# Patient Record
Sex: FEMALE | Race: BLACK OR AFRICAN AMERICAN | NOT HISPANIC OR LATINO | URBAN - METROPOLITAN AREA
[De-identification: names, ages, dates, MRNs, and addresses within clinical notes are randomized per-mention and may not be internally consistent; named-entity substitution may affect disease eponyms.]

---

## 2019-02-06 VITALS
RESPIRATION RATE: 20 BRPM | RESPIRATION RATE: 29 BRPM | HEART RATE: 70 BPM | HEART RATE: 65 BPM | WEIGHT: 125 LBS | HEART RATE: 79 BPM | DIASTOLIC BLOOD PRESSURE: 63 MMHG | DIASTOLIC BLOOD PRESSURE: 80 MMHG | RESPIRATION RATE: 22 BRPM | OXYGEN SATURATION: 100 % | HEIGHT: 63 IN | SYSTOLIC BLOOD PRESSURE: 87 MMHG | SYSTOLIC BLOOD PRESSURE: 96 MMHG | TEMPERATURE: 97 F | SYSTOLIC BLOOD PRESSURE: 123 MMHG | DIASTOLIC BLOOD PRESSURE: 74 MMHG | HEART RATE: 68 BPM | HEART RATE: 64 BPM | SYSTOLIC BLOOD PRESSURE: 91 MMHG | DIASTOLIC BLOOD PRESSURE: 42 MMHG | SYSTOLIC BLOOD PRESSURE: 109 MMHG | DIASTOLIC BLOOD PRESSURE: 39 MMHG | RESPIRATION RATE: 14 BRPM | RESPIRATION RATE: 10 BRPM | SYSTOLIC BLOOD PRESSURE: 136 MMHG | DIASTOLIC BLOOD PRESSURE: 46 MMHG

## 2019-02-11 ENCOUNTER — AMBULATORY SURGICAL CENTER (AMBULATORY)
Dept: URBAN - METROPOLITAN AREA SURGERY 17 | Facility: SURGERY | Age: 54
End: 2019-02-11
Payer: COMMERCIAL

## 2019-02-11 DIAGNOSIS — Z12.11 ENCOUNTER FOR SCREENING FOR MALIGNANT NEOPLASM OF COLON: ICD-10-CM

## 2019-02-11 DIAGNOSIS — Z80.0 FAMILY HISTORY OF MALIGNANT NEOPLASM OF DIGESTIVE ORGANS: ICD-10-CM

## 2019-02-11 PROCEDURE — 45378 DIAGNOSTIC COLONOSCOPY: CPT | Mod: 33

## 2020-09-24 ENCOUNTER — OFFICE VISIT (OUTPATIENT)
Dept: FAMILY MEDICINE CLINIC | Facility: CLINIC | Age: 55
End: 2020-09-24

## 2020-09-24 VITALS
SYSTOLIC BLOOD PRESSURE: 128 MMHG | HEIGHT: 62 IN | TEMPERATURE: 98.5 F | BODY MASS INDEX: 24.77 KG/M2 | WEIGHT: 134.6 LBS | OXYGEN SATURATION: 99 % | RESPIRATION RATE: 16 BRPM | HEART RATE: 62 BPM | DIASTOLIC BLOOD PRESSURE: 82 MMHG

## 2020-09-24 DIAGNOSIS — Z00.00 ANNUAL PHYSICAL EXAM: Primary | ICD-10-CM

## 2020-09-24 PROBLEM — G44.209 TENSION HEADACHE: Status: ACTIVE | Noted: 2019-08-01

## 2020-09-24 PROBLEM — E78.5 HYPERLIPIDEMIA: Status: ACTIVE | Noted: 2020-09-24

## 2020-09-24 PROCEDURE — 99396 PREV VISIT EST AGE 40-64: CPT | Performed by: INTERNAL MEDICINE

## 2020-09-24 RX ORDER — ROSUVASTATIN CALCIUM 10 MG/1
10 TABLET, COATED ORAL DAILY
COMMUNITY
End: 2021-09-03 | Stop reason: SDUPTHER

## 2020-09-24 NOTE — PROGRESS NOTES
09/24/2020    CC: Annual Exam  .        HPI  History of Present Illness     Subjective   Nancy Garcia is a 55 y.o. female.      The following portions of the patient's history were reviewed and updated as appropriate: allergies, current medications, past family history, past medical history, past social history, past surgical history and problem list.    Problem List  Patient Active Problem List   Diagnosis   • Tension headache   • Hyperlipidemia       Past Medical History  Past Medical History:   Diagnosis Date   • Hyperlipidemia 9/24/2020   • Tension headache 8/1/2019       Surgical History  Past Surgical History:   Procedure Laterality Date   • ABDOMINOPLASTY  2011   • LAPAROSCOPIC TUBAL LIGATION  2001       Family History  History reviewed. No pertinent family history.    Social History  Social History    Tobacco Use      Smoking status: Never Smoker      Smokeless tobacco: Never Used       Is the Patient a current tobacco user? No    Allergies  No Known Allergies    Current Medications    Current Outpatient Medications:   •  rosuvastatin (CRESTOR) 10 MG tablet, Take 10 mg by mouth Daily., Disp: , Rfl:      Review of System  Review of Systems   Constitutional: Negative.    HENT: Negative.    Eyes: Negative.    Respiratory: Negative.    Cardiovascular: Negative.    Gastrointestinal: Negative.    Musculoskeletal: Negative.    Skin: Negative.    Psychiatric/Behavioral: Negative.      I have reviewed and confirmed the accuracy of the ROS as documented by the MA/LPN/RN Murphy Mckee MD    Vitals:    09/24/20 1002   BP: 128/82   Pulse: 62   Resp: 16   Temp: 98.5 °F (36.9 °C)   SpO2: 99%     Body mass index is 24.82 kg/m².    Objective     No visits with results within 30 Day(s) from this visit.   Latest known visit with results is:   No results found for any previous visit.       Physical Exam  Physical Exam  Vitals signs and nursing note reviewed.   Constitutional:       Appearance: She is well-developed.    HENT:      Head: Normocephalic and atraumatic.   Eyes:      Conjunctiva/sclera: Conjunctivae normal.   Neck:      Musculoskeletal: Normal range of motion and neck supple.   Cardiovascular:      Rate and Rhythm: Normal rate and regular rhythm.      Heart sounds: Normal heart sounds.   Pulmonary:      Effort: Pulmonary effort is normal.      Breath sounds: Normal breath sounds.   Abdominal:      General: Bowel sounds are normal.      Palpations: Abdomen is soft.   Musculoskeletal: Normal range of motion.   Skin:     General: Skin is warm and dry.   Neurological:      Mental Status: She is alert and oriented to person, place, and time.   Psychiatric:         Behavior: Behavior normal.         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  She relates she is feeling fine she is had no problems in the interim of visits.    We relate her labs from 6/2020 which showed excellent comprehensive metabolic profile and CBC.  Lipid profile was likewise normal.  She is currently taking Crestor 10 mg p.o. daily and we noted that her LDL was only 62.    She relates she is scheduled to see her gynecologist in the next few weeks for her Pap and breast exam.        Nancy was seen today for annual exam.    Diagnoses and all orders for this visit:    Annual physical exam  -     Hepatitis C antibody    Other orders  -     Tdap Vaccine Greater Than or Equal To 8yo IM    Plan:  1.)  Continue Crestor 10 mg p.o. daily.  2.)  Follow-up in 6 months       Murphy Mckee MD  09/24/2020

## 2021-03-24 ENCOUNTER — OFFICE VISIT (OUTPATIENT)
Dept: FAMILY MEDICINE CLINIC | Facility: CLINIC | Age: 56
End: 2021-03-24

## 2021-03-24 VITALS
HEIGHT: 62 IN | DIASTOLIC BLOOD PRESSURE: 80 MMHG | SYSTOLIC BLOOD PRESSURE: 112 MMHG | TEMPERATURE: 98.2 F | OXYGEN SATURATION: 97 % | WEIGHT: 138.6 LBS | RESPIRATION RATE: 16 BRPM | BODY MASS INDEX: 25.51 KG/M2 | HEART RATE: 70 BPM

## 2021-03-24 DIAGNOSIS — R30.0 DYSURIA: ICD-10-CM

## 2021-03-24 DIAGNOSIS — E78.00 PURE HYPERCHOLESTEROLEMIA: Primary | ICD-10-CM

## 2021-03-24 LAB
BILIRUB BLD-MCNC: NEGATIVE MG/DL
CLARITY, POC: CLEAR
COLOR UR: YELLOW
GLUCOSE UR STRIP-MCNC: NEGATIVE MG/DL
KETONES UR QL: NEGATIVE
LEUKOCYTE EST, POC: NEGATIVE
NITRITE UR-MCNC: NEGATIVE MG/ML
PH UR: 6 [PH] (ref 5–8)
PROT UR STRIP-MCNC: ABNORMAL MG/DL
RBC # UR STRIP: NEGATIVE /UL
SP GR UR: 1.02 (ref 1–1.03)
UROBILINOGEN UR QL: NORMAL

## 2021-03-24 PROCEDURE — 81003 URINALYSIS AUTO W/O SCOPE: CPT | Performed by: INTERNAL MEDICINE

## 2021-03-24 PROCEDURE — 99213 OFFICE O/P EST LOW 20 MIN: CPT | Performed by: INTERNAL MEDICINE

## 2021-03-24 NOTE — PROGRESS NOTES
03/24/2021    CC: Hyperlipidemia (FOLLOW UP...no other issues)  .        HPI  This patient presents for discussion of her elevated cholesterol.  She relates she's feeling fine she's had no problems in the interim of visits.  She has not visited any urgent cares emergency room.  She does have some questions regarding the Covid 19vaccine.    Hyperlipidemia  This is a chronic problem. The current episode started more than 1 year ago. The problem is controlled. Recent lipid tests were reviewed and are normal. Current antihyperlipidemic treatment includes statins. The current treatment provides moderate improvement of lipids.        Subjective   Nancy Garcia is a 56 y.o. female.      The following portions of the patient's history were reviewed and updated as appropriate: allergies, current medications, past family history, past medical history, past social history, past surgical history and problem list.    Problem List  Patient Active Problem List   Diagnosis   • Tension headache   • Hyperlipidemia       Past Medical History  Past Medical History:   Diagnosis Date   • Hyperlipidemia 9/24/2020   • Tension headache 8/1/2019       Surgical History  Past Surgical History:   Procedure Laterality Date   • ABDOMINOPLASTY  2011   • LAPAROSCOPIC TUBAL LIGATION  2001       Family History  History reviewed. No pertinent family history.    Social History  Social History    Tobacco Use      Smoking status: Never Smoker      Smokeless tobacco: Never Used       Is the Patient a current tobacco user? No    Allergies  No Known Allergies    Current Medications    Current Outpatient Medications:   •  rosuvastatin (CRESTOR) 10 MG tablet, Take 10 mg by mouth Daily., Disp: , Rfl:      Review of System  Review of Systems   Eyes: Negative.    Respiratory: Negative.    Cardiovascular: Negative.    Gastrointestinal: Negative.    Endocrine: Negative.    Genitourinary: Positive for pelvic pressure.     I have reviewed and confirmed the accuracy  of the ROS as documented by the MA/LPN/RN Murphy Mckee MD    Vitals:    03/24/21 0948   BP: 112/80   Pulse: 70   Resp: 16   Temp: 98.2 °F (36.8 °C)   SpO2: 97%     Body mass index is 25.56 kg/m².    Objective     Physical Exam  Physical Exam  Eyes:      Extraocular Movements: Extraocular movements intact.   Cardiovascular:      Rate and Rhythm: Normal rate and regular rhythm.      Pulses: Normal pulses.      Heart sounds: Normal heart sounds.   Pulmonary:      Effort: Pulmonary effort is normal.      Breath sounds: Normal breath sounds.   Abdominal:      General: Abdomen is flat.         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  She relates she is taking her Crestor 10 mg as prescribed.  She has had no unusual achiness or joint discomfort.  She does complain of some pelvic discomfort.  She was seen recently by GYN and had a pelvic ultrasound done which showed no abnormalities.  She relates that the discomfort is occasionally with position.  It is mild and very occasional.  She has noted no change in her urinary stream no blood per urine. I reviewed with her her labs from 2018 consisting of CBC comprehensive metabolic profile and lipid panel.  They were essentially within normal limit.    We had a detailed discussion regarding the COVID-19 vaccine.  Is a [employer]  she would like to get the [employer] vaccine.  We will give her material on how to get signed up.    Her urine dip was unremarkable.  We will check her comprehensive metabolic panel and CBC.  We will see her back for follow-up in about 6 months.  We offered referral to urology for further evaluation of bladder discomfort however she declined.      Diagnoses and all orders for this visit:    1. Pure hypercholesterolemia (Primary)  -     Comprehensive Metabolic Panel  -     Lipid Panel With / Chol / HDL Ratio    2. Dysuria  -     Cancel: Urinalysis With Culture If Indicated -  -     CBC &  Differential  -     Hepatitis C Antibody  -     POCT urinalysis dipstick, automated      Plan:  1.)  Follow-up in 6 months.  For physical exam       Murphy Mckee MD  03/24/2021

## 2021-03-25 LAB
ALBUMIN SERPL-MCNC: 4.5 G/DL (ref 3.5–5.2)
ALBUMIN/GLOB SERPL: 2 G/DL
ALP SERPL-CCNC: 94 U/L (ref 39–117)
ALT SERPL-CCNC: 22 U/L (ref 1–33)
AST SERPL-CCNC: 18 U/L (ref 1–32)
BASOPHILS # BLD AUTO: 0.03 10*3/MM3 (ref 0–0.2)
BASOPHILS NFR BLD AUTO: 0.6 % (ref 0–1.5)
BILIRUB SERPL-MCNC: 0.5 MG/DL (ref 0–1.2)
BUN SERPL-MCNC: 9 MG/DL (ref 6–20)
BUN/CREAT SERPL: 14.5 (ref 7–25)
CALCIUM SERPL-MCNC: 9.3 MG/DL (ref 8.6–10.5)
CHLORIDE SERPL-SCNC: 104 MMOL/L (ref 98–107)
CHOLEST SERPL-MCNC: 135 MG/DL (ref 0–200)
CHOLEST/HDLC SERPL: 2.21 {RATIO}
CO2 SERPL-SCNC: 27.2 MMOL/L (ref 22–29)
CREAT SERPL-MCNC: 0.62 MG/DL (ref 0.57–1)
EOSINOPHIL # BLD AUTO: 0.12 10*3/MM3 (ref 0–0.4)
EOSINOPHIL NFR BLD AUTO: 2.4 % (ref 0.3–6.2)
ERYTHROCYTE [DISTWIDTH] IN BLOOD BY AUTOMATED COUNT: 12 % (ref 12.3–15.4)
GLOBULIN SER CALC-MCNC: 2.3 GM/DL
GLUCOSE SERPL-MCNC: 97 MG/DL (ref 65–99)
HCT VFR BLD AUTO: 41.4 % (ref 34–46.6)
HCV AB S/CO SERPL IA: <0.1 S/CO RATIO (ref 0–0.9)
HDLC SERPL-MCNC: 61 MG/DL (ref 40–60)
HGB BLD-MCNC: 14.2 G/DL (ref 12–15.9)
IMM GRANULOCYTES # BLD AUTO: 0.01 10*3/MM3 (ref 0–0.05)
IMM GRANULOCYTES NFR BLD AUTO: 0.2 % (ref 0–0.5)
LDLC SERPL CALC-MCNC: 63 MG/DL (ref 0–100)
LYMPHOCYTES # BLD AUTO: 1.38 10*3/MM3 (ref 0.7–3.1)
LYMPHOCYTES NFR BLD AUTO: 27.1 % (ref 19.6–45.3)
MCH RBC QN AUTO: 31.4 PG (ref 26.6–33)
MCHC RBC AUTO-ENTMCNC: 34.3 G/DL (ref 31.5–35.7)
MCV RBC AUTO: 91.6 FL (ref 79–97)
MONOCYTES # BLD AUTO: 0.36 10*3/MM3 (ref 0.1–0.9)
MONOCYTES NFR BLD AUTO: 7.1 % (ref 5–12)
NEUTROPHILS # BLD AUTO: 3.19 10*3/MM3 (ref 1.7–7)
NEUTROPHILS NFR BLD AUTO: 62.6 % (ref 42.7–76)
NRBC BLD AUTO-RTO: 0 /100 WBC (ref 0–0.2)
PLATELET # BLD AUTO: 260 10*3/MM3 (ref 140–450)
POTASSIUM SERPL-SCNC: 4.1 MMOL/L (ref 3.5–5.2)
PROT SERPL-MCNC: 6.8 G/DL (ref 6–8.5)
RBC # BLD AUTO: 4.52 10*6/MM3 (ref 3.77–5.28)
SODIUM SERPL-SCNC: 139 MMOL/L (ref 136–145)
TRIGL SERPL-MCNC: 47 MG/DL (ref 0–150)
VLDLC SERPL CALC-MCNC: 11 MG/DL (ref 5–40)
WBC # BLD AUTO: 5.09 10*3/MM3 (ref 3.4–10.8)

## 2021-09-03 DIAGNOSIS — E78.00 PURE HYPERCHOLESTEROLEMIA: Primary | ICD-10-CM

## 2021-09-03 RX ORDER — ROSUVASTATIN CALCIUM 10 MG/1
10 TABLET, COATED ORAL DAILY
Qty: 90 TABLET | Refills: 2 | Status: SHIPPED | OUTPATIENT
Start: 2021-09-03

## 2021-09-03 NOTE — TELEPHONE ENCOUNTER
Rx Refill Note  Requested Prescriptions     Pending Prescriptions Disp Refills   • rosuvastatin (CRESTOR) 10 MG tablet       Sig: Take 1 tablet by mouth Daily.      Last office visit with prescribing clinician: 3/24/2021      Next office visit with prescribing clinician: 9/24/2021            Nerissa Lewis MA  09/03/21, 15:37 EDT

## 2021-09-24 ENCOUNTER — OFFICE VISIT (OUTPATIENT)
Dept: FAMILY MEDICINE CLINIC | Facility: CLINIC | Age: 56
End: 2021-09-24

## 2021-09-24 VITALS
BODY MASS INDEX: 26.17 KG/M2 | SYSTOLIC BLOOD PRESSURE: 110 MMHG | DIASTOLIC BLOOD PRESSURE: 80 MMHG | WEIGHT: 142.2 LBS | HEIGHT: 62 IN | RESPIRATION RATE: 16 BRPM

## 2021-09-24 DIAGNOSIS — J30.2 SEASONAL ALLERGIC RHINITIS, UNSPECIFIED TRIGGER: Primary | Chronic | ICD-10-CM

## 2021-09-24 DIAGNOSIS — E78.00 PURE HYPERCHOLESTEROLEMIA: Chronic | ICD-10-CM

## 2021-09-24 DIAGNOSIS — Z20.822 CLOSE EXPOSURE TO COVID-19 VIRUS: ICD-10-CM

## 2021-09-24 PROBLEM — Z12.11 COLON CANCER SCREENING: Status: ACTIVE | Noted: 2021-09-01

## 2021-09-24 PROCEDURE — 99213 OFFICE O/P EST LOW 20 MIN: CPT | Performed by: INTERNAL MEDICINE

## 2021-09-25 LAB
CHOLEST SERPL-MCNC: 126 MG/DL (ref 0–200)
CHOLEST/HDLC SERPL: 2.52 {RATIO}
HDLC SERPL-MCNC: 50 MG/DL (ref 40–60)
LDLC SERPL CALC-MCNC: 64 MG/DL (ref 0–100)
SARS-COV-2 AB SERPL QL IA: NEGATIVE
TRIGL SERPL-MCNC: 53 MG/DL (ref 0–150)
VLDLC SERPL CALC-MCNC: 12 MG/DL (ref 5–40)

## 2021-09-28 NOTE — PROGRESS NOTES
09/24/2021    CC: Hyperlipidemia (f/u...no other issues)  .        HPI  Hyperlipidemia  This is a chronic problem. The current episode started more than 1 year ago. The problem is controlled. Recent lipid tests were reviewed and are normal. There are no known factors aggravating her hyperlipidemia. The current treatment provides no improvement of lipids. There are no compliance problems.  Risk factors for coronary artery disease include stress and family history.        Magdaleno Garcia is a 56 y.o. female.      The following portions of the patient's history were reviewed and updated as appropriate: allergies, current medications, past family history, past medical history, past social history, past surgical history and problem list.    Problem List  Patient Active Problem List   Diagnosis   • Tension headache   • Hyperlipidemia   • Colon cancer screening       Past Medical History  Past Medical History:   Diagnosis Date   • Hyperlipidemia 9/24/2020   • Tension headache 8/1/2019       Surgical History  Past Surgical History:   Procedure Laterality Date   • ABDOMINOPLASTY  2011   • LAPAROSCOPIC TUBAL LIGATION  2001       Family History  History reviewed. No pertinent family history.    Social History  Social History    Tobacco Use      Smoking status: Never Smoker      Smokeless tobacco: Never Used       Is the Patient a current tobacco user? No    Allergies  No Known Allergies    Current Medications    Current Outpatient Medications:   •  rosuvastatin (CRESTOR) 10 MG tablet, Take 1 tablet by mouth Daily., Disp: 90 tablet, Rfl: 2     Review of System  Review of Systems   Eyes: Negative.    Respiratory: Negative.    Cardiovascular: Negative.    Gastrointestinal: Negative.      I have reviewed and confirmed the accuracy of the ROS as documented by the MA/LPN/RN Murphy Mckee MD    Vitals:    09/24/21 1010   BP: 110/80   Resp: 16     Body mass index is 26.22 kg/m².    Objective     Physical Exam  Physical  Exam  HENT:      Head: Normocephalic.   Cardiovascular:      Rate and Rhythm: Normal rate and regular rhythm.      Pulses: Normal pulses.      Heart sounds: Normal heart sounds.   Pulmonary:      Effort: Pulmonary effort is normal.      Breath sounds: Normal breath sounds.         Assessment/Plan      This 56-year-old pleasant  presents today for follow-up of hyperlipidemia.  She has been on Crestor for greater than a year and is done exceptionally well.  She relates she has not had any muscle cramps or aching.  While working as a  she is concerned that she may have been exposed to COVID-19 although she has not had any symptoms.    Patient is very averse to medication and wants to stop her Crestor at this point.  She understands that with a rebound and LDL we may be necessary to restart her medication.  In any case she wants to give it a trial off the medication as she feels that her diet has changed over the past several months such that he can sustain normal levels.        Diagnoses and all orders for this visit:    1. Seasonal allergic rhinitis, unspecified trigger (Primary)    2. Pure hypercholesterolemia  -     Lipid Panel With / Chol / HDL Ratio    3. Close exposure to COVID-19 virus  -     Cancel: SARS-CoV-2 Antibodies ([facility]); Future  -     SARS-CoV-2 Antibodies ([facility])      Plan:  1.)  Lipid level  2.)  DC Crestor  3.)  Reevaluate lipid level in 6 months.       Murphy Mckee MD  09/24/2021

## 2023-07-17 ENCOUNTER — TELEPHONE (OUTPATIENT)
Dept: FAMILY MEDICINE CLINIC | Facility: CLINIC | Age: 58
End: 2023-07-17

## 2023-07-17 NOTE — TELEPHONE ENCOUNTER
Caller: Nancy Garcia    Relationship: Self    Best call back number: [phone]    What is the best time to reach you: ANYTIME    Who are you requesting to speak with (clinical staff, provider,  specific staff member): DR. GARCIA     What was the call regarding: PATIENT STATES SHE RECEIVED A MISSED CALL FROM DR. GARCIA IN REGARDS TO HER LAB RESULTS.     PATIENT IS REQUESTING A CALL BACK.

## 2023-09-19 ENCOUNTER — TELEPHONE (OUTPATIENT)
Dept: FAMILY MEDICINE CLINIC | Facility: CLINIC | Age: 58
End: 2023-09-19
Payer: COMMERCIAL

## 2023-09-19 NOTE — TELEPHONE ENCOUNTER
After several attempts I left a message with the patient's answering machine that her CT cardiac calcium score was excellent.  We will discuss this with her in more detail at her face-to-face appointment or by phone.

## 2024-07-08 VITALS
RESPIRATION RATE: 20 BRPM | DIASTOLIC BLOOD PRESSURE: 57 MMHG | RESPIRATION RATE: 22 BRPM | HEART RATE: 86 BPM | HEART RATE: 76 BPM | RESPIRATION RATE: 19 BRPM | OXYGEN SATURATION: 100 % | SYSTOLIC BLOOD PRESSURE: 104 MMHG | OXYGEN SATURATION: 96 % | HEART RATE: 74 BPM | DIASTOLIC BLOOD PRESSURE: 58 MMHG | OXYGEN SATURATION: 98 % | DIASTOLIC BLOOD PRESSURE: 52 MMHG | SYSTOLIC BLOOD PRESSURE: 102 MMHG | DIASTOLIC BLOOD PRESSURE: 69 MMHG | RESPIRATION RATE: 24 BRPM | RESPIRATION RATE: 18 BRPM | HEART RATE: 58 BPM | HEIGHT: 63 IN | WEIGHT: 140 LBS | SYSTOLIC BLOOD PRESSURE: 115 MMHG | RESPIRATION RATE: 26 BRPM | SYSTOLIC BLOOD PRESSURE: 112 MMHG | HEART RATE: 60 BPM | SYSTOLIC BLOOD PRESSURE: 139 MMHG | DIASTOLIC BLOOD PRESSURE: 63 MMHG | DIASTOLIC BLOOD PRESSURE: 85 MMHG | DIASTOLIC BLOOD PRESSURE: 92 MMHG | SYSTOLIC BLOOD PRESSURE: 155 MMHG | HEART RATE: 69 BPM | SYSTOLIC BLOOD PRESSURE: 164 MMHG | SYSTOLIC BLOOD PRESSURE: 172 MMHG | TEMPERATURE: 97 F | OXYGEN SATURATION: 99 % | TEMPERATURE: 97.9 F | DIASTOLIC BLOOD PRESSURE: 74 MMHG | DIASTOLIC BLOOD PRESSURE: 88 MMHG

## 2024-07-10 ENCOUNTER — AMBULATORY SURGICAL CENTER (AMBULATORY)
Dept: URBAN - METROPOLITAN AREA SURGERY 17 | Facility: SURGERY | Age: 59
End: 2024-07-10
Payer: COMMERCIAL

## 2024-07-10 ENCOUNTER — OFFICE (AMBULATORY)
Dept: URBAN - METROPOLITAN AREA PATHOLOGY 4 | Facility: PATHOLOGY | Age: 59
End: 2024-07-10

## 2024-07-10 DIAGNOSIS — D12.2 BENIGN NEOPLASM OF ASCENDING COLON: ICD-10-CM

## 2024-07-10 DIAGNOSIS — Z12.11 ENCOUNTER FOR SCREENING FOR MALIGNANT NEOPLASM OF COLON: ICD-10-CM

## 2024-07-10 DIAGNOSIS — Z80.0 FAMILY HISTORY OF MALIGNANT NEOPLASM OF DIGESTIVE ORGANS: ICD-10-CM

## 2024-07-10 PROBLEM — K63.5 POLYP OF COLON: Status: ACTIVE | Noted: 2024-07-10

## 2024-07-10 LAB
GI HISTOLOGY: A. ASCENDING COLON: (no result)
GI HISTOLOGY: PDF REPORT: (no result)

## 2024-07-10 PROCEDURE — 88305 TISSUE EXAM BY PATHOLOGIST: CPT | Performed by: PATHOLOGY

## 2024-07-10 PROCEDURE — 45385 COLONOSCOPY W/LESION REMOVAL: CPT | Mod: 33 | Performed by: INTERNAL MEDICINE

## 2024-08-27 ENCOUNTER — OFFICE VISIT (OUTPATIENT)
Dept: FAMILY MEDICINE CLINIC | Facility: CLINIC | Age: 59
End: 2024-08-27
Payer: COMMERCIAL

## 2024-08-27 VITALS
BODY MASS INDEX: 27.6 KG/M2 | WEIGHT: 150 LBS | HEIGHT: 62 IN | SYSTOLIC BLOOD PRESSURE: 150 MMHG | DIASTOLIC BLOOD PRESSURE: 80 MMHG

## 2024-08-27 DIAGNOSIS — E78.5 HYPERLIPIDEMIA, UNSPECIFIED HYPERLIPIDEMIA TYPE: ICD-10-CM

## 2024-08-27 DIAGNOSIS — Z13.6 SCREENING FOR HYPERTENSION: Primary | ICD-10-CM

## 2024-08-27 DIAGNOSIS — G89.29 CHRONIC BILATERAL LOW BACK PAIN WITHOUT SCIATICA: ICD-10-CM

## 2024-08-27 DIAGNOSIS — M54.50 CHRONIC BILATERAL LOW BACK PAIN WITHOUT SCIATICA: ICD-10-CM

## 2024-08-27 DIAGNOSIS — Z13.0 SCREENING FOR DEFICIENCY ANEMIA: ICD-10-CM

## 2024-08-27 LAB
ALBUMIN SERPL-MCNC: 4.3 G/DL (ref 3.5–5.2)
ALBUMIN/GLOB SERPL: 1.6 G/DL
ALP SERPL-CCNC: 106 U/L (ref 39–117)
ALT SERPL-CCNC: 18 U/L (ref 1–33)
AST SERPL-CCNC: 16 U/L (ref 1–32)
BASOPHILS # BLD AUTO: 0.03 10*3/MM3 (ref 0–0.2)
BASOPHILS NFR BLD AUTO: 0.6 % (ref 0–1.5)
BILIRUB SERPL-MCNC: 0.4 MG/DL (ref 0–1.2)
BUN SERPL-MCNC: 10 MG/DL (ref 6–20)
BUN/CREAT SERPL: 14.9 (ref 7–25)
CALCIUM SERPL-MCNC: 9.3 MG/DL (ref 8.6–10.5)
CHLORIDE SERPL-SCNC: 101 MMOL/L (ref 98–107)
CHOLEST SERPL-MCNC: 233 MG/DL (ref 0–200)
CO2 SERPL-SCNC: 28.6 MMOL/L (ref 22–29)
CREAT SERPL-MCNC: 0.67 MG/DL (ref 0.57–1)
EGFRCR SERPLBLD CKD-EPI 2021: 101.5 ML/MIN/1.73
EOSINOPHIL # BLD AUTO: 0.21 10*3/MM3 (ref 0–0.4)
EOSINOPHIL NFR BLD AUTO: 4.1 % (ref 0.3–6.2)
ERYTHROCYTE [DISTWIDTH] IN BLOOD BY AUTOMATED COUNT: 12.3 % (ref 12.3–15.4)
GLOBULIN SER CALC-MCNC: 2.7 GM/DL
GLUCOSE SERPL-MCNC: 97 MG/DL (ref 65–99)
HCT VFR BLD AUTO: 42.4 % (ref 34–46.6)
HDLC SERPL-MCNC: 59 MG/DL (ref 40–60)
HGB BLD-MCNC: 14.2 G/DL (ref 12–15.9)
IMM GRANULOCYTES # BLD AUTO: 0.01 10*3/MM3 (ref 0–0.05)
IMM GRANULOCYTES NFR BLD AUTO: 0.2 % (ref 0–0.5)
LDLC SERPL CALC-MCNC: 158 MG/DL (ref 0–100)
LDLC/HDLC SERPL: 2.63 {RATIO}
LYMPHOCYTES # BLD AUTO: 2.53 10*3/MM3 (ref 0.7–3.1)
LYMPHOCYTES NFR BLD AUTO: 49.3 % (ref 19.6–45.3)
MCH RBC QN AUTO: 30.5 PG (ref 26.6–33)
MCHC RBC AUTO-ENTMCNC: 33.5 G/DL (ref 31.5–35.7)
MCV RBC AUTO: 91 FL (ref 79–97)
MONOCYTES # BLD AUTO: 0.29 10*3/MM3 (ref 0.1–0.9)
MONOCYTES NFR BLD AUTO: 5.7 % (ref 5–12)
NEUTROPHILS # BLD AUTO: 2.06 10*3/MM3 (ref 1.7–7)
NEUTROPHILS NFR BLD AUTO: 40.1 % (ref 42.7–76)
NRBC BLD AUTO-RTO: 0 /100 WBC (ref 0–0.2)
PLATELET # BLD AUTO: 314 10*3/MM3 (ref 140–450)
POTASSIUM SERPL-SCNC: 4.4 MMOL/L (ref 3.5–5.2)
PROT SERPL-MCNC: 7 G/DL (ref 6–8.5)
RBC # BLD AUTO: 4.66 10*6/MM3 (ref 3.77–5.28)
SODIUM SERPL-SCNC: 139 MMOL/L (ref 136–145)
TRIGL SERPL-MCNC: 93 MG/DL (ref 0–150)
VLDLC SERPL CALC-MCNC: 16 MG/DL (ref 5–40)
WBC # BLD AUTO: 5.13 10*3/MM3 (ref 3.4–10.8)

## 2024-08-27 RX ORDER — ROSUVASTATIN CALCIUM 10 MG/1
10 TABLET, COATED ORAL DAILY
Qty: 90 TABLET | Refills: 3 | Status: SHIPPED | OUTPATIENT
Start: 2024-08-27

## 2024-09-09 NOTE — PROGRESS NOTES
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  She was originally scheduled for physical examination but she wants this converted to a discussion regarding elevated cholesterol.  She gives us permission to leave messages on her answering machine.  Total time of the visit was 30 minutes which was spent counseling the patient regarding hypercholesteremia.  She has been resistant to starting cholesterol medication. In review of her labs going back to 7/3/2023 shows that her LDL was elevated at 187.  She relates that she was definitely fasting at that time and it is noted that her prior LDL was only 60 for 2 years before.  With that elevated level we prescribed Crestor but the patient related that she was reluctant to take it and felt that she could watch her diet carefully and lower her levels that way.  We had an extended discussion regarding how cholesterol plaques accumulate and in spite of the lack of symptoms can lead to major problems such as strokes, myocardial infarction and other problems.    She also relates that she has had low back pain off and on indeed we discussed this 1 year or so ago and a x-ray of the lumbar sacral spine was ordered but she has not been able to get that completed.  She relates that she is very resistant to taking medication and feels that the low back pain which is without sciatica is low-grade and although nagging is not major.  We further had a discussion regarding whether or not an MRI of the lumbar sacral spine was indicated.  At this point I do not feel it is.    Will schedule the patient for a lumbar sacral spine to evaluate for possible DJD and will get a lipid profile fasting to reevaluate her cholesterol status.  Will also look at a calcium CT to add to our information regarding her lipid status and plaque progression.    Total time of the visit was 30 minutes during which we  reviewed her past chart discussed and addressed her concerns and formulated the diagnosis.  Will see her back for follow-up in the next several weeks at her convenience with physical examination.      Diagnoses and all orders for this visit:    1. Screening for hypertension (Primary)  -     Comprehensive metabolic panel    2. Screening for deficiency anemia  -     CBC w AUTO Differential    3. Hyperlipidemia, unspecified hyperlipidemia type  -     Lipid Panel With LDL/HDL Ratio    4. Chronic bilateral low back pain without sciatica  -     XR Spine Lumbar 2 or 3 View; Future    Other orders  -     rosuvastatin (Crestor) 10 MG tablet; Take 1 tablet by mouth Daily.  Dispense: 90 tablet; Refill: 3      BMI is >= 25 and <30. (Overweight) The following options were offered after discussion;: exercise counseling/recommendations    Plan:  1.)  Prescription for Crestor would be written at 10 mg p.o. daily  2.)  X-ray of the lumbar sacral spine evaluate for DJD  3.)  Comprehensive metabolic profile, CBC, lipid profile       CC: Annual Exam  .        HPI  History of Present Illness     Subjective   Nancy Garcia is a 59 y.o. female.      The following portions of the patient's history were reviewed and updated as appropriate: allergies, current medications, past family history, past medical history, past social history, past surgical history, and problem list.    Problem List  Patient Active Problem List   Diagnosis    Tension headache    Hyperlipidemia    Colon cancer screening       Past Medical History  Past Medical History:   Diagnosis Date    Hyperlipidemia 9/24/2020    Tension headache 8/1/2019       Surgical History  Past Surgical History:   Procedure Laterality Date    ABDOMINOPLASTY  2011    LAPAROSCOPIC TUBAL LIGATION  2001       Family History  History reviewed. No pertinent family history.    Social History  Social History    Tobacco Use      Smoking status: Never      Smokeless tobacco: Never       Is the Patient  a current tobacco user? No    Allergies  No Known Allergies    Current Medications    Current Outpatient Medications:     rosuvastatin (Crestor) 10 MG tablet, Take 1 tablet by mouth Daily., Disp: 90 tablet, Rfl: 3     Review of System  Review of Systems   Constitutional:  Negative for chills and fever.   Respiratory:  Negative for cough and shortness of breath.    Cardiovascular:  Negative for chest pain and palpitations.   Gastrointestinal:  Negative for constipation, diarrhea, nausea and vomiting.   Neurological:  Negative for dizziness and headache.     I have reviewed and confirmed the accuracy of the ROS as documented by the MA/LPN/RN Murphy Mckee MD    Vitals:    08/27/24 1004   BP: 150/80     Body mass index is 27.66 kg/m².    Objective     Physical Exam  Physical Exam  Constitutional:       General: She is not in acute distress.     Appearance: Normal appearance.   HENT:      Head: Normocephalic and atraumatic.   Cardiovascular:      Rate and Rhythm: Normal rate and regular rhythm.   Pulmonary:      Effort: Pulmonary effort is normal. No respiratory distress.      Breath sounds: Normal breath sounds. No wheezing, rhonchi or rales.   Neurological:      General: No focal deficit present.      Mental Status: She is alert and oriented to person, place, and time.   Psychiatric:         Mood and Affect: Mood normal.         Behavior: Behavior normal.         Thought Content: Thought content normal.         Judgment: Judgment normal.             Murphy Mckee MD  08/27/2024

## 2024-09-25 ENCOUNTER — OFFICE VISIT (OUTPATIENT)
Dept: FAMILY MEDICINE CLINIC | Facility: CLINIC | Age: 59
End: 2024-09-25
Payer: COMMERCIAL

## 2024-09-25 VITALS
BODY MASS INDEX: 27.42 KG/M2 | SYSTOLIC BLOOD PRESSURE: 112 MMHG | DIASTOLIC BLOOD PRESSURE: 82 MMHG | HEIGHT: 62 IN | WEIGHT: 149 LBS

## 2024-09-25 DIAGNOSIS — Z00.00 ENCOUNTER FOR PHYSICAL EXAMINATION: Primary | ICD-10-CM

## 2024-09-25 PROCEDURE — 99396 PREV VISIT EST AGE 40-64: CPT | Performed by: INTERNAL MEDICINE

## 2024-10-09 ENCOUNTER — OFFICE VISIT (OUTPATIENT)
Dept: FAMILY MEDICINE CLINIC | Facility: CLINIC | Age: 59
End: 2024-10-09
Payer: COMMERCIAL

## 2024-10-09 VITALS
SYSTOLIC BLOOD PRESSURE: 138 MMHG | BODY MASS INDEX: 28.32 KG/M2 | DIASTOLIC BLOOD PRESSURE: 88 MMHG | HEIGHT: 61 IN | WEIGHT: 150 LBS

## 2024-10-09 DIAGNOSIS — E78.00 PURE HYPERCHOLESTEROLEMIA: Primary | ICD-10-CM

## 2024-10-09 PROCEDURE — 99213 OFFICE O/P EST LOW 20 MIN: CPT | Performed by: INTERNAL MEDICINE

## 2024-10-10 NOTE — PROGRESS NOTES
09/25/2024    Assessment & Plan     This 59-year-old  presents at this time for physical examination.  She relates she has had no chest pain or shortness of breath in the interim of visits.  She denies any shortness of breath or chest pain.  With a detailed discussion regarding her lipids which have been elevated at 158 in the past she has been resistant to being on medication especially a statin but is willing to be reevaluated.  We also discussed with her the additional information that could be gleaned from a calcium CT and she is agreeable to having this done as well. Her last LDL was elevated at 158 on 8/27/2024 but she had not been adherent to her Crestor or other statins prescribed in the past and her diet could have been better.  Will see her back following a calcium CT.  Diagnoses and all orders for this visit:    1. Encounter for physical examination (Primary)                  CC: Hyperlipidemia (F/U.  Follow up labs)  .        HPI  Hyperlipidemia         Subjective   Nancy Garcia is a 59 y.o. female.      The following portions of the patient's history were reviewed and updated as appropriate: allergies, current medications, past family history, past medical history, past social history, past surgical history, and problem list.    Problem List  Patient Active Problem List   Diagnosis    Tension headache    Hyperlipidemia    Colon cancer screening       Past Medical History  Past Medical History:   Diagnosis Date    Hyperlipidemia 9/24/2020    Tension headache 8/1/2019       Surgical History  Past Surgical History:   Procedure Laterality Date    ABDOMINOPLASTY  2011    LAPAROSCOPIC TUBAL LIGATION  2001       Family History  History reviewed. No pertinent family history.    Social History  Social History    Tobacco Use      Smoking status: Never      Smokeless tobacco: Never       Is the Patient a current tobacco user? No    Allergies  No Known Allergies    Current Medications    Current  Outpatient Medications:     rosuvastatin (Crestor) 10 MG tablet, Take 1 tablet by mouth Daily., Disp: 90 tablet, Rfl: 3     Review of System  Review of Systems   Constitutional: Negative.    HENT: Negative.     Eyes: Negative.    Respiratory: Negative.     Cardiovascular: Negative.    Gastrointestinal: Negative.    Endocrine: Negative.    Genitourinary: Negative.    Musculoskeletal: Negative.    Skin: Negative.    Allergic/Immunologic: Negative.    Neurological: Negative.    Hematological: Negative.    Psychiatric/Behavioral: Negative.       I have reviewed and confirmed the accuracy of the ROS as documented by the MA/LPN/RN Murphy Mckee MD    Vitals:    09/25/24 1009   BP: 112/82     Body mass index is 27.5 kg/m².    Objective     Physical Exam  Physical Exam  Vitals and nursing note reviewed.   Constitutional:       Appearance: She is well-developed.   HENT:      Head: Normocephalic and atraumatic.   Eyes:      Conjunctiva/sclera: Conjunctivae normal.   Cardiovascular:      Rate and Rhythm: Normal rate and regular rhythm.      Heart sounds: Normal heart sounds.   Pulmonary:      Effort: Pulmonary effort is normal.      Breath sounds: Normal breath sounds.   Abdominal:      General: Bowel sounds are normal.      Palpations: Abdomen is soft.   Musculoskeletal:         General: Normal range of motion.      Cervical back: Normal range of motion and neck supple.   Skin:     General: Skin is warm and dry.   Neurological:      Mental Status: She is alert and oriented to person, place, and time.   Psychiatric:         Behavior: Behavior normal.           Murphy Mckee MD  09/25/2024

## 2024-10-23 ENCOUNTER — HOSPITAL ENCOUNTER (OUTPATIENT)
Dept: GENERAL RADIOLOGY | Facility: HOSPITAL | Age: 59
Discharge: HOME | End: 2024-10-23
Admitting: INTERNAL MEDICINE
Payer: COMMERCIAL

## 2024-10-23 DIAGNOSIS — M54.50 CHRONIC BILATERAL LOW BACK PAIN WITHOUT SCIATICA: ICD-10-CM

## 2024-10-23 DIAGNOSIS — G89.29 CHRONIC BILATERAL LOW BACK PAIN WITHOUT SCIATICA: ICD-10-CM

## 2024-10-23 PROCEDURE — 72100 X-RAY EXAM L-S SPINE 2/3 VWS: CPT

## 2024-10-23 NOTE — PROGRESS NOTES
10/09/2024    Assessment & Plan   This pleasant  presents today for discussion regarding hyperlipidemia.  Her previous LDLs have been elevated and she has been very resistant to statins and so to get better data on her condition we ordered a calcium CT which we discussed today.    Her total Agatston score was 0 and no vessels other than her coronary arteries showed any arterial plaque burden.  Based on the 2018 CAC-DRS consensus the patient would be scored as a A 0 CAC 0.  Her risk is very low and statin generally was not recommended excluding familial hypercholesterolemia.  Note is made that she does not have a familial history of coronary artery disease.  She does not want statins medication.  We discussed with her her LDL of 158 as of 8/27.    Her blood pressure was 138/88 but on recheck by me in the left arm sitting position standard cuff was 120/80.  Her BMI is 28.4.    Note was also made on the calcium CT that there are degenerative changes of the thoracic spine.  These mild to minimal at this point.  Will continue to monitor.      Diagnoses and all orders for this visit:    1. Pure hypercholesterolemia (Primary)         Plan:  1.)  Will see the patient back for follow-up in about 6 months.         CC: Hyperlipidemia (F/U---no other issues)  .        HPI  Hyperlipidemia  Pertinent negatives include no chest pain or shortness of breath.        Subjective   Nancy Garcia is a 59 y.o. female.      The following portions of the patient's history were reviewed and updated as appropriate: allergies, current medications, past family history, past medical history, past social history, past surgical history, and problem list.    Problem List  Patient Active Problem List   Diagnosis    Tension headache    Hyperlipidemia    Colon cancer screening       Past Medical History  Past Medical History:   Diagnosis Date    Hyperlipidemia 9/24/2020    Tension headache 8/1/2019       Surgical History  Past Surgical  History:   Procedure Laterality Date    ABDOMINOPLASTY  2011    LAPAROSCOPIC TUBAL LIGATION  2001       Family History  History reviewed. No pertinent family history.    Social History  Social History    Tobacco Use      Smoking status: Never      Smokeless tobacco: Never       Is the Patient a current tobacco user? No    Allergies  No Known Allergies    Current Medications    Current Outpatient Medications:     rosuvastatin (Crestor) 10 MG tablet, Take 1 tablet by mouth Daily., Disp: 90 tablet, Rfl: 3     Review of System  Review of Systems   Constitutional:  Negative for chills and fever.   Respiratory:  Negative for cough and shortness of breath.    Cardiovascular:  Negative for chest pain and palpitations.   Gastrointestinal:  Negative for constipation, diarrhea, nausea and vomiting.   Neurological:  Negative for dizziness and headache.     I have reviewed and confirmed the accuracy of the ROS as documented by the MA/LPN/RN Murphy Mckee MD    Vitals:    10/09/24 1112   BP: 138/88     Body mass index is 28.34 kg/m².    Objective     Physical Exam  Physical Exam  Constitutional:       General: She is not in acute distress.     Appearance: Normal appearance.   HENT:      Head: Normocephalic and atraumatic.   Cardiovascular:      Rate and Rhythm: Normal rate and regular rhythm.   Pulmonary:      Effort: Pulmonary effort is normal. No respiratory distress.      Breath sounds: Normal breath sounds. No wheezing, rhonchi or rales.   Neurological:      General: No focal deficit present.      Mental Status: She is alert and oriented to person, place, and time.   Psychiatric:         Mood and Affect: Mood normal.         Behavior: Behavior normal.         Thought Content: Thought content normal.         Judgment: Judgment normal.             Murphy Mckee MD  10/09/2024